# Patient Record
(demographics unavailable — no encounter records)

---

## 2024-11-20 NOTE — ASSESSMENT
[FreeTextEntry1] : Alert, oriented, well, pleasant.  1.3cm erythematous bright protuberant tender nodule right lateral chest anterior axillary line. Abscess. Options discussed.  Informed consent given. Side effects reviewed. Kenalog 2.5 mg/cc intralesional. 0.3ml. Tolerated well. Wound care instructed.  Erythema and scale antecubital fossae bilaterally. Atopic dermatitis. Moisturize whole skin surface daily. start triamcinolone cream twice per day until clear and then prn only. Side effects discussed with patient.

## 2024-11-20 NOTE — HISTORY OF PRESENT ILLNESS
[FreeTextEntry1] : Painful bump right chest. Itchy rash arms. For 2-3 weeks. No treatment. [de-identified] : Denies previous history of eczema, asthma, allergies, hay fever. Daughter has eczema. h/o "boils"

## 2024-12-30 NOTE — HISTORY OF PRESENT ILLNESS
[FreeTextEntry1] : folliculitis [de-identified] : recurrent folliculitis/possible early prurigo - comes and goes. today one lesion L arm, one L leg. recent other areas resolved today prev tx with topical antiseptic, TCS

## 2024-12-30 NOTE — ASSESSMENT
[FreeTextEntry1] : recurrent folliculitis/possible early prurigo - comes and goes. today one lesion L arm, one L leg. recent other areas resolved today prev tx with topical antiseptic, TCS  Dx, nature, mgmt options disc Pt in favor of topical/PO combo therapy. Proper use and SE meds disc. utmost imp of picking/popping avoidance discussed -start hibiclens -> clinda lotion topical daily -start doxy 50mg bid with meals -HC oint prn itch

## 2024-12-30 NOTE — PHYSICAL EXAM
[FreeTextEntry3] :  Gen:                        Well appearing, well nourished, NAD. Skin:                       Eccrine:                 Within normal limits         Face:                      Neck:                      Chest:                                        UE:                         LE:                                Neuro:                     No focal deficits. Age appropriate. Psych:                     Appropriate affect.

## 2025-03-11 NOTE — HISTORY OF PRESENT ILLNESS
[FreeTextEntry1] : folliculitis [de-identified] : recurrent folliculitis/possible early prurigo - improved with topicals + PO. no longer taking PO sitll waxes and wanes. most recent lesion R axilla, had drained. no longer active today also new rash on the neck

## 2025-03-11 NOTE — ASSESSMENT
[FreeTextEntry1] : -- recurrent folliculitis - improved with topicals + PO. no longer taking PO sitll waxes and wanes. most recent lesion R axilla, had drained. no longer active today also new rash on the neck  #Recurrent folliculitis vs early HS in pt with h/o PCOS -continue topical hibiclens -continue topical clinda daily after cleansing - will switch out lotion for gel -off doxy 50 bid - cont to monitor for need for long term PO (doxy vs spirono)  #contact dermatitis - new problem, flaring -gentle care measures and allergen avoidance disc -start HC 2.5 oint max 2 weeks

## 2025-03-13 NOTE — REVIEW OF SYSTEMS
[EDS: ESS=____] : daytime somnolence: ESS=[unfilled] [Snoring] : snoring [Witnessed Apneas] : no witnessed apnea [A.M. Dry Mouth] : no a.m. dry mouth [Difficulty Initiating Sleep] : no difficulty falling asleep [Difficulty Maintaining Sleep] : no difficulty maintaining sleep [Lower Extremity Discomfort] : no lower extremity discomfort [Unusual Sleep Behavior] : no unusual sleep behavior [Hypersomnolence] : not sleeping much more than usual [Negative] : Genitourinary [de-identified] : per hpi

## 2025-03-13 NOTE — ASSESSMENT
[FreeTextEntry1] : 33 yo F presents for initial evaluation of sleep-related bruxism. PMH: ADHD, anxiety/depression Meds: Sertraline 200 mg, Vyvanse 40 mg, Melatonin 3 mg Patient has been grinding her teeth for years, however states this has worsened and now has daytime clenching and grinding since October 2023 since increasing her medication dosing. Was switched from Sertraline to Wellbutrin by her psychiatrist, however, this had no effect on her symptoms so she resumed Sertraline. She has been maintained on Vyvanse for years for ADHD. Feels her anxiety and ADHD are well controlled on these medications Has been seen by her dentist who provided with a  for her bruxism which she uses.  A/P:Sleep related bruxism Likely related to underlying anxiety, SSRI and amphetamine medications- I discussed this with the patient and she states that this was the reason her psychiatrist had trialed switching her to Wellbutrin last year however noted no difference.  Will send for diagnostic PSG at the [facility] to evaluate for underlying sleep disordered breathing that may be contributing to sleep related bruxism Encouraged to continue using a dental guard to protect her teeth

## 2025-03-13 NOTE — HISTORY OF PRESENT ILLNESS
[FreeTextEntry1] : 35 yo F presents for initial evaluation of sleep-related bruxism.  PMH: ADHD, anxiety/depression Meds: Sertraline 200 mg, Vyvanse 40 mg, Melatonin 3 mg Patient has been grinding her teeth for years, however states this has worsened and now has daytime clenching and grinding since October 2023 since increasing her medication dosing. Was switched from Sertraline to Wellbutrin by her psychiatrist, however, this had no effect on her symptoms so she resumed Sertraline. She has been maintained on Vyvanse for years for ADHD. Feels her anxiety and ADHD are well controlled on these medications Has been seen by her dentist who provided with a  for her bruxism which she uses.   Sleep history:  Main complaints of nonrestorative sleep, snoring and daytime somnolence. Bed: 10-10:30 pm, no difficultly falling asleep, wakes 1x to urinate, out of bed at 5:30 am  Unrefreshed upon awakening.  Morning headaches: denies Dry mouth/throat: denies Weight trend: stable Denies drowsy driving, denies any accidents or near accidents.  EDS with ESS of 13 Will nap around 11 am (in her car at work)  Quality Metrics: Smoking history: smoked cigarettes and marijuana in her teen years ESS:13  [Other: ___] : [unfilled]

## 2025-03-13 NOTE — REVIEW OF SYSTEMS
[EDS: ESS=____] : daytime somnolence: ESS=[unfilled] [Snoring] : snoring [Witnessed Apneas] : no witnessed apnea [A.M. Dry Mouth] : no a.m. dry mouth [Difficulty Initiating Sleep] : no difficulty falling asleep [Difficulty Maintaining Sleep] : no difficulty maintaining sleep [Lower Extremity Discomfort] : no lower extremity discomfort [Unusual Sleep Behavior] : no unusual sleep behavior [Hypersomnolence] : not sleeping much more than usual [Negative] : Genitourinary [de-identified] : per hpi

## 2025-03-13 NOTE — ASSESSMENT
[FreeTextEntry1] : 35 yo F presents for initial evaluation of sleep-related bruxism. PMH: ADHD, anxiety/depression Meds: Sertraline 200 mg, Vyvanse 40 mg, Melatonin 3 mg Patient has been grinding her teeth for years, however states this has worsened and now has daytime clenching and grinding since October 2023 since increasing her medication dosing. Was switched from Sertraline to Wellbutrin by her psychiatrist, however, this had no effect on her symptoms so she resumed Sertraline. She has been maintained on Vyvanse for years for ADHD. Feels her anxiety and ADHD are well controlled on these medications Has been seen by her dentist who provided with a  for her bruxism which she uses.  A/P:Sleep related bruxism Likely related to underlying anxiety, SSRI and amphetamine medications- I discussed this with the patient and she states that this was the reason her psychiatrist had trialed switching her to Wellbutrin last year however noted no difference.  Will send for diagnostic PSG at the [facility] disorders center to evaluate for underlying sleep disordered breathing that may be contributing to sleep related bruxism Encouraged to continue using a dental guard to protect her teeth

## 2025-03-13 NOTE — HISTORY OF PRESENT ILLNESS
[FreeTextEntry1] : 33 yo F presents for initial evaluation of sleep-related bruxism.  PMH: ADHD, anxiety/depression Meds: Sertraline 200 mg, Vyvanse 40 mg, Melatonin 3 mg Patient has been grinding her teeth for years, however states this has worsened and now has daytime clenching and grinding since October 2023 since increasing her medication dosing. Was switched from Sertraline to Wellbutrin by her psychiatrist, however, this had no effect on her symptoms so she resumed Sertraline. She has been maintained on Vyvanse for years for ADHD. Feels her anxiety and ADHD are well controlled on these medications Has been seen by her dentist who provided with a  for her bruxism which she uses.   Sleep history:  Main complaints of nonrestorative sleep, snoring and daytime somnolence. Bed: 10-10:30 pm, no difficultly falling asleep, wakes 1x to urinate, out of bed at 5:30 am  Unrefreshed upon awakening.  Morning headaches: denies Dry mouth/throat: denies Weight trend: stable Denies drowsy driving, denies any accidents or near accidents.  EDS with ESS of 13 Will nap around 11 am (in her car at work)  Quality Metrics: Smoking history: smoked cigarettes and marijuana in her teen years ESS:13  [Other: ___] : [unfilled]

## 2025-03-13 NOTE — PHYSICAL EXAM
[General Appearance - Well Developed] : well developed [General Appearance - Well Nourished] : well nourished [General Appearance - In No Acute Distress] : no acute distress [Normal Conjunctiva] : the conjunctiva exhibited no abnormalities [Eyelids - No Xanthelasma] : the eyelids demonstrated no xanthelasmas [Normal Oropharynx] : normal oropharynx [FreeTextEntry1] : mild tooth wear [Neck Appearance] : the appearance of the neck was normal [Heart Rate And Rhythm] : heart rate was normal and rhythm regular [Heart Sounds] : normal S1 and S2 [Murmurs] : no murmurs [] : no respiratory distress [Respiration, Rhythm And Depth] : normal respiratory rhythm and effort [Exaggerated Use Of Accessory Muscles For Inspiration] : no accessory muscle use [Auscultation Breath Sounds / Voice Sounds] : lungs were clear to auscultation bilaterally [Motor Tone] : muscle strength and tone were normal [Nail Clubbing] : no clubbing of the fingernails [Cyanosis, Localized] : no localized cyanosis [Oriented To Time, Place, And Person] : oriented to person, place, and time [Impaired Insight] : insight and judgment were intact [Affect] : the affect was normal [Mood] : the mood was normal

## 2025-06-03 NOTE — REASON FOR VISIT
[Telehealth (audio & video)] : This visit was provided via telehealth using real-time 2-way audio visual technology. [Follow-Up] : a follow-up visit [FreeTextEntry2] : bruxism

## 2025-06-03 NOTE — ASSESSMENT
[FreeTextEntry1] : 33 yo F presents for initial evaluation of sleep-related bruxism. PMH: ADHD, anxiety/depression Meds: Sertraline 200 mg, Vyvanse 40 mg, Melatonin 3 mg Patient has been grinding her teeth for years, however states this has worsened and now has daytime clenching and grinding since October 2023 since increasing her medication dosing. Was switched from Sertraline to Wellbutrin by her psychiatrist, however, this had no effect on her symptoms so she resumed Sertraline. She has been maintained on Vyvanse for years for ADHD. Feels her anxiety and ADHD are well controlled on these medications Has been seen by her dentist who provided with a  for her bruxism which she uses.  A/P:Sleep related bruxism Likely related to underlying anxiety, SSRI and amphetamine medications- I discussed this with the patient and she states that this was the reason her psychiatrist had trialed switching her to Wellbutrin last year however noted no difference.  Will send for diagnostic PSG at the [facility] disorders center to evaluate for underlying sleep disordered breathing that may be contributing to sleep related bruxism Encouraged to continue using a dental guard to protect her teeth

## 2025-06-03 NOTE — HISTORY OF PRESENT ILLNESS
[Other: ___] : [unfilled] [FreeTextEntry1] : 33 yo F presents for initial evaluation of sleep-related bruxism.  PMH: ADHD, anxiety/depression Meds: Sertraline 200 mg, Vyvanse 40 mg, Melatonin 3 mg Patient has been grinding her teeth for years, however states this has worsened and now has daytime clenching and grinding since October 2023 since increasing her medication dosing. Was switched from Sertraline to Wellbutrin by her psychiatrist, however, this had no effect on her symptoms so she resumed Sertraline. She has been maintained on Vyvanse for years for ADHD. Feels her anxiety and ADHD are well controlled on these medications Has been seen by her dentist who provided with a  for her bruxism which she uses.   Sleep history:  Main complaints of nonrestorative sleep, snoring and daytime somnolence. Bed: 10-10:30 pm, no difficultly falling asleep, wakes 1x to urinate, out of bed at 5:30 am  Unrefreshed upon awakening.  Morning headaches: denies Dry mouth/throat: denies Weight trend: stable Denies drowsy driving, denies any accidents or near accidents.  EDS with ESS of 13 Will nap around 11 am (in her car at work)  Quality Metrics: Smoking history: smoked cigarettes and marijuana in her teen years ESS:13  Follow up Televisit 6/3/25: Reviewed and discussed the results of her recent diagnostic PSG 5/24/25: no evidence of WALLY, however there were episodes of bruxism during wake to sleep transitions.   Plan:

## 2025-06-03 NOTE — REVIEW OF SYSTEMS
[EDS: ESS=____] : daytime somnolence: ESS=[unfilled] [Snoring] : snoring [Negative] : Genitourinary [Witnessed Apneas] : no witnessed apnea [A.M. Dry Mouth] : no a.m. dry mouth [Difficulty Initiating Sleep] : no difficulty falling asleep [Difficulty Maintaining Sleep] : no difficulty maintaining sleep [Lower Extremity Discomfort] : no lower extremity discomfort [Unusual Sleep Behavior] : no unusual sleep behavior [Hypersomnolence] : not sleeping much more than usual [de-identified] : per hpi

## 2025-07-29 NOTE — HEALTH RISK ASSESSMENT
[Good] : ~his/her~  mood as  good [2 - 4 times a month (2 pts)] : 2-4 times a month (2 points) [PHQ-2 Negative - No further assessment needed] : PHQ-2 Negative - No further assessment needed [PapSmearDate] : 2023

## 2025-07-29 NOTE — PLAN
[FreeTextEntry1] : check annual routine bloodwork- CBC, CMP, A1C, TSH, Lipid panel, Vit D, UA medication reconciliation performed advised healthy diet/exercise discussed vaccines- had flu and Covid vaccines last October.   Form for work- check MMR titers, Hep B sAb, Quantiferon Depression- sees Psych, currently off all meds but might resume after appt with ENT Teeth grinding at night- has appt with ENT  h/o PCOS- advised to f/u with GYN

## 2025-07-29 NOTE — HISTORY OF PRESENT ILLNESS
[FreeTextEntry1] : 33 yo female here as a new pt and for annual PE.  h/o depression; had suicide attempt in 2/23 and stayed in the psych hospital for a few weeks. Sees psychiatrist now; was on meds but now temporarily off so she can go to ENT for workup of teeth grinding.  has h/o of PCOS- used to be on Aldactone but stopped. Gets regular periods now.